# Patient Record
Sex: FEMALE | Race: WHITE | URBAN - METROPOLITAN AREA
[De-identification: names, ages, dates, MRNs, and addresses within clinical notes are randomized per-mention and may not be internally consistent; named-entity substitution may affect disease eponyms.]

---

## 2018-03-29 PROBLEM — F33.40 RECURRENT MAJOR DEPRESSIVE DISORDER, IN REMISSION: Status: ACTIVE | Noted: 2018-03-29

## 2018-03-29 PROBLEM — F33.0 MILD EPISODE OF RECURRENT MAJOR DEPRESSIVE DISORDER (HCC): Status: ACTIVE | Noted: 2018-03-29

## 2018-03-29 PROBLEM — F33.0 MILD EPISODE OF RECURRENT MAJOR DEPRESSIVE DISORDER: Status: ACTIVE | Noted: 2018-03-29

## 2018-03-29 PROBLEM — F33.40 RECURRENT MAJOR DEPRESSIVE DISORDER, IN REMISSION (HCC): Status: ACTIVE | Noted: 2018-03-29

## 2018-03-29 PROBLEM — F41.1 GAD (GENERALIZED ANXIETY DISORDER): Status: ACTIVE | Noted: 2018-03-29

## 2018-03-29 PROBLEM — F90.9 ATTENTION DEFICIT HYPERACTIVITY DISORDER (ADHD): Status: ACTIVE | Noted: 2018-03-29

## 2019-01-10 PROBLEM — F39 EPISODIC MOOD DISORDER: Status: ACTIVE | Noted: 2019-01-10

## 2019-01-10 PROBLEM — F39 EPISODIC MOOD DISORDER (HCC): Status: ACTIVE | Noted: 2019-01-10

## 2024-03-01 ENCOUNTER — OFFICE VISIT (OUTPATIENT)
Dept: UROLOGY | Facility: HOSPITAL | Age: 49
End: 2024-03-01
Attending: OBSTETRICS & GYNECOLOGY
Payer: COMMERCIAL

## 2024-03-01 VITALS — WEIGHT: 279 LBS | HEIGHT: 70 IN | BODY MASS INDEX: 39.94 KG/M2 | RESPIRATION RATE: 18 BRPM

## 2024-03-01 DIAGNOSIS — N39.41 URGE INCONTINENCE: ICD-10-CM

## 2024-03-01 DIAGNOSIS — R35.1 NOCTURIA: ICD-10-CM

## 2024-03-01 DIAGNOSIS — N81.84 PELVIC MUSCLE WASTING: ICD-10-CM

## 2024-03-01 DIAGNOSIS — N39.3 FEMALE STRESS INCONTINENCE: Primary | ICD-10-CM

## 2024-03-01 PROCEDURE — 87086 URINE CULTURE/COLONY COUNT: CPT | Performed by: OBSTETRICS & GYNECOLOGY

## 2024-03-01 RX ORDER — BUPROPION HYDROCHLORIDE 150 MG/1
150 TABLET ORAL DAILY
COMMUNITY

## 2024-03-01 RX ORDER — PROGESTERONE 100 MG/1
100 CAPSULE ORAL NIGHTLY
COMMUNITY

## 2024-03-01 RX ORDER — MELOXICAM 15 MG/1
15 TABLET ORAL DAILY
COMMUNITY

## 2024-03-01 RX ORDER — TOLTERODINE 4 MG/1
4 CAPSULE, EXTENDED RELEASE ORAL DAILY
COMMUNITY

## 2024-03-01 NOTE — PROGRESS NOTES
Norma Patrick, DO  3/1/2024     Referred by PFPT  Pt here with , Gene    Chief Complaint   Patient presents with    Incontinence     TRUMAN > UUI      HPI:  +TRUMAN  +UUI  Nocturia x1-2  Denies prolapse sx  +dyspareunia  Constipated bowels    PRIOR TREATMENTS:    Kegels  Physical Therapy  Recently started detrol, min relief  Took myrbetriq x2 mo  Took another med & stopped for constipation (vesicare)    Reports h/o UTIs  No gross hematuria    , vd x4  Cycles reg  Ablation in 2019    Biggest bother is TRUMAN  Some hesitation with sling surgery, wants to try pessary    Vitals:  Resp 18   Ht 70\"   Wt 279 lb (126.6 kg)   BMI 40.03 kg/m²      HISTORY:  Past Medical History:   Diagnosis Date    Arrhythmia     in past during go off effexor but determined per holter negative    Back pain     Cancer (HCC)     skin    Esophageal reflux     Essential hypertension     Gallbladder attack     not functioning, RUQ pain,     Gastroparesis     Headache disorder     pms headaches; better    Mass of stomach     Menorrhagia with regular cycle     Mild episode of recurrent major depressive disorder (HCC) 3/29/2018    Obesity     Post partum depression     four children; each time    Sleep apnea     cpap in past when gained wt      Past Surgical History:   Procedure Laterality Date    D & C      OTHER SURGICAL HISTORY      back surgery herniated disc lumbar    OTHER SURGICAL HISTORY      eyelid left    OTHER SURGICAL HISTORY      microdicectomy    OTHER SURGICAL HISTORY      skin cancer removed    OTHER SURGICAL HISTORY      laps and GI    OTHER SURGICAL HISTORY      TONSILLECTOMY        Family History   Problem Relation Age of Onset    Cancer Father     Heart Disorder Father     Anxiety Father     Other (A fib) Father     Other (leukemia) Father     Other (melanoma) Father     Depression Mother         oldest; was inpt at [facility] 2017; latuda, tourettes; eating disorder    Anxiety Mother     OCD Daughter     Depression Daughter      ADHD Son         Cash SEGURA ritalin    Cancer Maternal Grandfather     Hypertension Brother     Lipids Brother       Social History     Socioeconomic History    Marital status:    Tobacco Use    Smoking status: Never    Smokeless tobacco: Never   Substance and Sexual Activity    Alcohol use: Yes     Comment: socially couple times per year    Drug use: No        Allergies:  Allergies   Allergen Reactions    Sulfa Antibiotics RASH     As a child       Medications:  Outpatient Medications Prior to Visit   Medication Sig Dispense Refill    buPROPion  MG Oral Tablet 24 Hr Take 1 tablet (150 mg total) by mouth daily.      Meloxicam 15 MG Oral Tab Take 1 tablet (15 mg total) by mouth daily.      tolterodine ER 4 MG Oral Capsule SR 24 Hr Take 1 capsule (4 mg total) by mouth daily.      progesterone 100 MG Oral Cap Take 1 capsule (100 mg total) by mouth nightly. 1st 10 days of the month      ampicillin 500 MG Oral Cap       glycopyrrolate 2 MG Oral Tab       omeprazole 20 MG Oral Capsule Delayed Release Take 20 mg by mouth.      ampicillin Take 500 mg by mouth.      Triamterene Does not apply Powder Take 37.5 mg by mouth. Not sure dosage        No facility-administered medications prior to visit.       Urogynecology Summary:  Urogynecology Summary  Prolapse: No  TRUMAN: Yes (Reports more bothersome)  Urge Incontinence: Yes  Nocturia Frequency: 1 (Reports 1 to 2 times during the night.)  Frequency: 2 hours  Incomplete emptying: Yes (Reports sense of incomplete emptying at times. Reports shifting positions.)  Constipation: Yes (Bowel regimen reviewed.)  Wears pad day?: 6 (Wet during the day)  Wears Pad Night?: 1  Activities are limited by UI/POP?: No  Currently Sexually Active: Yes  Avoids sexual activity due to: Pain (Reports dysparunia. Reports using lubrication which is helpful)    Review of Systems:    A comprehensive 12 point review of systems was completed.  Pertinent positives noted in the the HPI.  No CP  No  SOB    GENERAL EXAM:  GENERAL:  Alert and Oriented, and NAD  HEENT:  Normal, no lesions  LUNGS:  Normal effort  HEART:  RRR  ABDOMEN: soft, no mass, no hernia  EXTREM:  Normal, no edema  SKIN:  Normal, no lesions    PELVIC EXAM:  Ext. Gen: no atrophy, no lesions  Urethra: no atrophy, nontender  Bladder:some fullness, nontender  Vagina: early atrophy  Cervix: + bleeding c/w menses, no lesions, nontender (tampon removed)  Uterus: +mobile  Adnexa:no masses, nontender  Perineum: nontender  Anus: wnl  Rectum: defer    PELVIS FLOOR NEUROMUSCULAR FUNCTION:  Strength:  1 and Unable to hold greater than 3 sec  Perineal Sensation:  Normal      PELVIC SUPPORT:  [place]:  1  Ant:  2  Post:  1  CST:  negative  UVJ: +hypermobile    Impression/Plan:    ICD-10-CM    1. Female stress incontinence  N39.3 Urine Culture, Routine      2. Urge incontinence  N39.41       3. Pelvic muscle wasting  N81.84       4. Nocturia  R35.1           Discussion Items:   Urodynamics and cystoscopy for evaluation of LUTS  Behavioral and pharmacologic treatments for OAB  Nonsurgical and surgical treatments for Stress Urinary Incontinence  Nonsurgical and surgical treatments for POP  Pelvic muscle rehabilitation including pelvic floor PT  Bowel routine/constipation regimen  Discussed dietary and behavioral modification, discussed pharmacologic and nonpharmacologic mgmt options for urinary symptoms. Discussed dietary & weight management with potential improvements in symptoms with weight loss.    Bowel management reviewed. Discussed using fiber daily w/ addition of miralax as needed    Diagnostic Items:  Urine culture    Medications Discussed:  Estrace Cream  OAB meds    Treatment Plan, Non-surgical:   RN teaching/pt education done  Fiber supplement  Detrol LA  Trial pessary    Treatment Plan, Surgical:   Mid-urethral slings (Trans Vaginal Taping, TOT, single-incision)    Pt verbalizes understanding of all above discussed information. All questions answered.  She agrees to plan    Return for pessary trial.    Norma Patrick DO, FACOG, FACS      Discussion undertaken in English, info provided

## 2024-03-04 ENCOUNTER — TELEPHONE (OUTPATIENT)
Dept: UROLOGY | Facility: HOSPITAL | Age: 49
End: 2024-03-04

## 2024-03-04 NOTE — TELEPHONE ENCOUNTER
Left detailed message that  the results from urine culture obtained on 3/1/24 showed no growth.  No treatment is necessary at this time.  Please call if you have na questions or concerns [phone]   Thank you   Frida SPEAR

## 2024-03-14 ENCOUNTER — OFFICE VISIT (OUTPATIENT)
Dept: UROLOGY | Facility: HOSPITAL | Age: 49
End: 2024-03-14
Attending: PHYSICIAN ASSISTANT
Payer: COMMERCIAL

## 2024-03-14 VITALS — BODY MASS INDEX: 39.94 KG/M2 | HEIGHT: 70 IN | RESPIRATION RATE: 18 BRPM | WEIGHT: 279 LBS

## 2024-03-14 DIAGNOSIS — N81.84 PELVIC MUSCLE WASTING: ICD-10-CM

## 2024-03-14 DIAGNOSIS — N39.3 FEMALE STRESS INCONTINENCE: Primary | ICD-10-CM

## 2024-03-14 DIAGNOSIS — N39.41 URGE INCONTINENCE: ICD-10-CM

## 2024-03-14 PROCEDURE — 99212 OFFICE O/P EST SF 10 MIN: CPT

## 2024-03-14 PROCEDURE — 57160 INSERT PESSARY/OTHER DEVICE: CPT

## 2024-03-14 NOTE — PROGRESS NOTES
Pt here for pessary trial due to bulge, TRUMAN    Wants to exhaust conservative options    She is not currently interested in surgical management of her pelvic organ prolapse / TRUMAN  Desires home care    Currently taking tolterodine ER 4 mg for UUI  Denies SEs  Reports some improvement    Previously tried:  Vesicare (constipation)  Myrbetriq    Urogynecology Summary:  Urogynecology Summary  Prolapse: No  TRUMAN: Yes (Reports more bothersome to patient.)  Urge Incontinence: Yes (Tolterodine 4 mg po daily( for the past two months). No side effects.)  Nocturia Frequency: 1 (1 to 2 times per night)  Frequency: 2 hours  Incomplete emptying: Yes (Reports a sense of incomplete emptying at times. reports shifting positions to void.)  Constipation: Yes (Reports following bowel regimen. Reports daily bowel movements.)  Wears pad day?:  (4)  Wears Pad Night?: 1  Activities are limited by UI/POP?: No  Currently Sexually Active: Yes  Avoids sexual activity due to: Pain (Reports dysparunia using lubrication which is helpful)    Vitals:  Vitals:    03/14/24 1228   Resp: 18       GENERAL EXAM:  GENERAL: alert & oriented, NAD  HEENT: NC/AT, sclera without injection  SKIN: no rashes  LUNGS:  without increased respiratory effort  ABDOMEN: soft, non-tender, non-distended, no masses appreciated  EXT: no edema    PELVIC EXAM:  Ext. Gen: no atrophy, no lesions  Urethra: no atrophy, nontender  Bladder:some fullness, nontender  Vagina: early atrophy  Cervix: no bleeding, no lesions, nontender  Uterus: +mobile  Adnexa: no masses, nontender  Perineum: nontender  Anus: wnl  Rectum: defer     PELVIS FLOOR NEUROMUSCULAR FUNCTION:  Strength:  1 and Unable to hold greater than 3 sec  Perineal Sensation:  Normal        PELVIC SUPPORT:  [place]:  1  Ant:  2  Post:  1  CST:  negative  UVJ: +hypermobile      A #3 incontinence dish was placed without difficulty.  Patient stated it was comfortable and supportive but leaked with jumping, removed  A #2 large knob  incontinence dish was placed without difficulty  Pt reported it to be comfortable and supportive, no leaking  Able to void freely (reports slower stream), 800 mL  Able to remove and insert independently.    Impression:    ICD-10-CM    1. Female stress incontinence  N39.3       2. Pelvic muscle wasting  N81.84       3. Urge incontinence  N39.41           Discussion Items:   Discussed management of pelvic organ prolapse including but not limited to behavioral modifications, conservative options, and surgical management.   Discussed pessary management including benefits and risks. Discussed importance of keeping regularly scheduled pessary checks in prevention of complications related to pessary use.     Treatment Plan:  Continue pessary management with plan for home care  Call with s/sx of UTI, problems with pessary   Continue tolterodine ER 4 mg daily  Bowel regimen  Pelvic floor home exercises  All questions answered  She understands and agrees to plan    Return in about 5 weeks (around 4/18/2024) for pessary check.      La Doe PA-C

## 2025-04-04 ENCOUNTER — TELEPHONE (OUTPATIENT)
Dept: INTERNAL MEDICINE CLINIC | Facility: CLINIC | Age: 50
End: 2025-04-04

## 2025-04-04 ENCOUNTER — OFFICE VISIT (OUTPATIENT)
Dept: INTERNAL MEDICINE CLINIC | Facility: CLINIC | Age: 50
End: 2025-04-04
Payer: COMMERCIAL

## 2025-04-04 VITALS
SYSTOLIC BLOOD PRESSURE: 134 MMHG | TEMPERATURE: 98 F | RESPIRATION RATE: 16 BRPM | HEART RATE: 86 BPM | DIASTOLIC BLOOD PRESSURE: 80 MMHG | BODY MASS INDEX: 36.41 KG/M2 | WEIGHT: 254.31 LBS | OXYGEN SATURATION: 99 % | HEIGHT: 70.12 IN

## 2025-04-04 DIAGNOSIS — R00.2 PALPITATIONS: Primary | ICD-10-CM

## 2025-04-04 DIAGNOSIS — G89.29 CHRONIC BILATERAL LOW BACK PAIN WITHOUT SCIATICA: ICD-10-CM

## 2025-04-04 DIAGNOSIS — E66.01 CLASS 2 SEVERE OBESITY DUE TO EXCESS CALORIES WITH SERIOUS COMORBIDITY AND BODY MASS INDEX (BMI) OF 36.0 TO 36.9 IN ADULT (HCC): ICD-10-CM

## 2025-04-04 DIAGNOSIS — M79.671 BILATERAL FOOT PAIN: ICD-10-CM

## 2025-04-04 DIAGNOSIS — R60.0 LOCALIZED EDEMA: ICD-10-CM

## 2025-04-04 DIAGNOSIS — F41.1 GAD (GENERALIZED ANXIETY DISORDER): ICD-10-CM

## 2025-04-04 DIAGNOSIS — K21.9 GASTROESOPHAGEAL REFLUX DISEASE, UNSPECIFIED WHETHER ESOPHAGITIS PRESENT: ICD-10-CM

## 2025-04-04 DIAGNOSIS — F33.0 MILD EPISODE OF RECURRENT MAJOR DEPRESSIVE DISORDER: ICD-10-CM

## 2025-04-04 DIAGNOSIS — E78.00 PURE HYPERCHOLESTEROLEMIA: ICD-10-CM

## 2025-04-04 DIAGNOSIS — M79.672 BILATERAL FOOT PAIN: ICD-10-CM

## 2025-04-04 DIAGNOSIS — F90.9 ATTENTION DEFICIT HYPERACTIVITY DISORDER (ADHD), UNSPECIFIED ADHD TYPE: ICD-10-CM

## 2025-04-04 DIAGNOSIS — K44.9 HIATAL HERNIA: ICD-10-CM

## 2025-04-04 DIAGNOSIS — E66.812 CLASS 2 SEVERE OBESITY DUE TO EXCESS CALORIES WITH SERIOUS COMORBIDITY AND BODY MASS INDEX (BMI) OF 36.0 TO 36.9 IN ADULT (HCC): ICD-10-CM

## 2025-04-04 DIAGNOSIS — R42 LIGHTHEADEDNESS: ICD-10-CM

## 2025-04-04 DIAGNOSIS — M54.50 CHRONIC BILATERAL LOW BACK PAIN WITHOUT SCIATICA: ICD-10-CM

## 2025-04-04 DIAGNOSIS — I10 PRIMARY HYPERTENSION: ICD-10-CM

## 2025-04-04 PROBLEM — F39 EPISODIC MOOD DISORDER: Status: RESOLVED | Noted: 2019-01-10 | Resolved: 2025-04-04

## 2025-04-04 PROCEDURE — 3075F SYST BP GE 130 - 139MM HG: CPT | Performed by: NURSE PRACTITIONER

## 2025-04-04 PROCEDURE — 3008F BODY MASS INDEX DOCD: CPT | Performed by: NURSE PRACTITIONER

## 2025-04-04 PROCEDURE — 99205 OFFICE O/P NEW HI 60 MIN: CPT | Performed by: NURSE PRACTITIONER

## 2025-04-04 PROCEDURE — 3079F DIAST BP 80-89 MM HG: CPT | Performed by: NURSE PRACTITIONER

## 2025-04-04 RX ORDER — DEXTROAMPHETAMINE SACCHARATE, AMPHETAMINE ASPARTATE MONOHYDRATE, DEXTROAMPHETAMINE SULFATE AND AMPHETAMINE SULFATE 7.5; 7.5; 7.5; 7.5 MG/1; MG/1; MG/1; MG/1
30 CAPSULE, EXTENDED RELEASE ORAL DAILY
COMMUNITY
Start: 2025-02-24 | End: 2025-04-04

## 2025-04-04 RX ORDER — LOSARTAN POTASSIUM 100 MG/1
TABLET ORAL
COMMUNITY
Start: 2025-04-01

## 2025-04-04 RX ORDER — ANTIOX #8/OM3/DHA/EPA/LUT/ZEAX 250-2.5 MG
CAPSULE ORAL
COMMUNITY

## 2025-04-04 RX ORDER — OXYBUTYNIN CHLORIDE 10 MG/1
10 TABLET, EXTENDED RELEASE ORAL DAILY
COMMUNITY
Start: 2025-02-07

## 2025-04-04 RX ORDER — MELATONIN
1000 DAILY
COMMUNITY

## 2025-04-04 RX ORDER — DEXTROAMPHETAMINE SACCHARATE, AMPHETAMINE ASPARTATE MONOHYDRATE, DEXTROAMPHETAMINE SULFATE AND AMPHETAMINE SULFATE 2.5; 2.5; 2.5; 2.5 MG/1; MG/1; MG/1; MG/1
10 CAPSULE, EXTENDED RELEASE ORAL DAILY
COMMUNITY
End: 2025-04-04

## 2025-04-04 RX ORDER — DEXTROAMPHETAMINE SACCHARATE, AMPHETAMINE ASPARTATE MONOHYDRATE, DEXTROAMPHETAMINE SULFATE AND AMPHETAMINE SULFATE 7.5; 7.5; 7.5; 7.5 MG/1; MG/1; MG/1; MG/1
30 CAPSULE, EXTENDED RELEASE ORAL DAILY
Qty: 30 CAPSULE | Refills: 0 | Status: SHIPPED | OUTPATIENT
Start: 2025-04-04

## 2025-04-04 RX ORDER — HYDROCHLOROTHIAZIDE 12.5 MG/1
12.5 TABLET ORAL DAILY
Qty: 30 TABLET | Refills: 0 | Status: SHIPPED | OUTPATIENT
Start: 2025-04-04

## 2025-04-04 RX ORDER — DEXTROAMPHETAMINE SACCHARATE, AMPHETAMINE ASPARTATE MONOHYDRATE, DEXTROAMPHETAMINE SULFATE AND AMPHETAMINE SULFATE 2.5; 2.5; 2.5; 2.5 MG/1; MG/1; MG/1; MG/1
10 CAPSULE, EXTENDED RELEASE ORAL DAILY
Qty: 30 CAPSULE | Refills: 0 | Status: SHIPPED | OUTPATIENT
Start: 2025-04-04

## 2025-04-04 RX ORDER — PROGESTERONE 200 MG/1
200 CAPSULE ORAL DAILY
COMMUNITY
Start: 2025-01-28

## 2025-04-04 RX ORDER — HYDROCHLOROTHIAZIDE 25 MG/1
25 TABLET ORAL EVERY MORNING
COMMUNITY
End: 2025-04-04 | Stop reason: DRUGHIGH

## 2025-04-04 RX ORDER — OMEPRAZOLE 40 MG/1
40 CAPSULE, DELAYED RELEASE ORAL 2 TIMES DAILY
COMMUNITY
Start: 2025-02-28

## 2025-04-04 NOTE — PATIENT INSTRUCTIONS
Decrease the hydrochlorothiazide to 12.5 mg daily and monitor your blood pressure closely as well as your lightheadedness.     Continue your other current medications    Labs will be ordered once your complete record is reviewed in the orders will be mailed to you.    Continue to see cardiology    Get the heart scan and carotid scan done    Make an appointment to see psychiatry once you get referrals    Go to the ER for any emergent symptoms. If you cannot get there safely call 911.     Follow-up as needed or in 3 months for a blood pressure check.  If your annual physical is due sooner you may return for that.

## 2025-04-04 NOTE — PROGRESS NOTES
[facility]    CHIEF COMPLAINT:    Chief Complaint   Patient presents with    [facility]     New Patient     ER F/U     02/17/2025 , heart stuff going on          HISTORY OF PRESENT ILLNESS:  The patient is a 49 year old year old female who presents to [facility].     HTN- was on amlodipine. Now on losartan and hydrochlorothiazide. SE of dizziness- feels lightheaded since being on hydrochlorothiazide. Is mild to moderate. No near syncope or syncope. Is hydrating well.     HLD- low CVD risk. Not on statin. Family hx of stroke at young age for father while in his 40s.    Obesity- was 410 pounds and is working on it- lost 150 with lifestyle changes. Down 60 pounds since October- slightly unexplainable due to not trying. Less stress though.     Has localized edema to BLE for years at the end of the day. Compression socks helps.     Has a hx of lumbar radiculopathy and is s/p 2 back surgeries. Back is stable.     Hx of plantar fascitis and foot pain. Foot pain is better- stable.     Depression, anxiety, ADD-  current medications  are working well.  Possible SE to ADD medication.     In February she was treated in  for right side chest pain thought to be pleurisy. Placed on steroids. About a week later she felt like her heart was racing and an irregular rhythm. Then noticed after every workout she was having HA. Would drink water and would get better but felt off through the day. Arms and legs felt heavy. Numbness arms and legs > arms. Gradual BLE swelling - did not have improvement and after work it made it worse. Had swelling to arms. Warm and hot to knees and ankles. Pitting edema. Went to ER at [place]- no record available. Then went to PCP on thurs and was started on hydrochlorothiazide. She also recently saw cardiology and an echo and holter were done. Echo completed. Holter on now. She denies chest pain, sob. She did have erythema and warmth to her knees and ankles when they were the most swollen.  That has improved.    Medical, surgical, family, and social hx reviewed in detail.     Past Medical History:   Past Medical History:    Anxiety    Arrhythmia    in past during go off effexor but determined per holter negative    Back pain    Cancer (HCC)    skin    Depression    Esophageal reflux    Essential hypertension    Gallbladder attack    not functioning, RUQ pain,     Gastroparesis    Headache disorder    pms headaches; better    Mass of stomach    Menorrhagia with regular cycle    Mild episode of recurrent major depressive disorder    Obesity    Post partum depression    four children; each time    Sleep apnea    cpap in past when gained wt        Past Surgical History:   Past Surgical History:   Procedure Laterality Date    Colonoscopy      D & c      Other surgical history      back surgery herniated disc lumbar    Other surgical history      eyelid left    Other surgical history      microdicectomy    Other surgical history      skin cancer removed    Other surgical history      laps and GI    Other surgical history      Tonsillectomy         Current Medications:    Current Outpatient Medications   Medication Sig Dispense Refill    losartan 100 MG Oral Tab losartan 100 mg tablet, [RxNorm: 626148]      amphetamine-dextroamphetamine ER 30 MG Oral Capsule SR 24 Hr Take 1 capsule (30 mg total) by mouth daily.      buPROPion  MG Oral Tablet 24 Hr Take 1 tablet (150 mg total) by mouth daily.      omeprazole 20 MG Oral Capsule Delayed Release Take 1 capsule (20 mg total) by mouth.      amphetamine-dextroamphetamine ER 10 MG Oral Capsule SR 24 Hr Take 1 capsule (10 mg total) by mouth daily.      Meloxicam 15 MG Oral Tab Take 1 tablet (15 mg total) by mouth daily.      tolterodine ER 4 MG Oral Capsule SR 24 Hr Take 1 capsule (4 mg total) by mouth daily.      progesterone 100 MG Oral Cap Take 1 capsule (100 mg total) by mouth nightly. 1st 10 days of the month      ampicillin 500 MG Oral Cap        glycopyrrolate 2 MG Oral Tab       ampicillin Take 500 mg by mouth.      Triamterene Does not apply Powder Take 37.5 mg by mouth. Not sure dosage             Allergies:    Allergies as of 04/04/2025 - Review Complete 04/04/2025   Allergen Reaction Noted    Sulfa antibiotics RASH 06/28/2017       SOCIAL HISTORY:    Social History     Socioeconomic History    Marital status:      Spouse name: Not on file    Number of children: Not on file    Years of education: Not on file    Highest education level: Not on file   Occupational History    Not on file   Tobacco Use    Smoking status: Never    Smokeless tobacco: Never   Substance and Sexual Activity    Alcohol use: Yes     Comment: socially couple times per year    Drug use: No    Sexual activity: Not on file   Other Topics Concern    Not on file   Social History Narrative    Not on file     Social Drivers of Health     Food Insecurity: Not on file   Transportation Needs: Not on file   Stress: Not on file   Housing Stability: Not on file       FAMILY HISTORY:   Family History   Problem Relation Age of Onset    Cancer Father     Heart Disorder Father     Other (A fib) Father     Other (leukemia) Father     Other (melanoma) Father     Depression Mother         oldest; was inpt at [facility] 2017; latuda, boris; eating disorder    Anxiety Mother     Pacemaker Mother     OCD Daughter     Depression Daughter     Bipolar Disorder Daughter     High Blood Pressure Daughter     High Blood Pressure Son     High Cholesterol Son     Dementia Maternal Grandmother     Colon Cancer Maternal Grandfather     No Known Problems Paternal Grandmother     No Known Problems Paternal Grandfather     Hypertension Brother        REVIEW OF SYSTEMS:  Review of Systems   Constitutional: Negative.    HENT: Negative.     Eyes: Negative.    Respiratory: Negative.     Cardiovascular:  Positive for palpitations and leg swelling.   Gastrointestinal: Negative.    Endocrine: Negative.    Genitourinary:  Negative.    Musculoskeletal: Negative.    Skin: Negative.    Allergic/Immunologic: Negative.    Neurological: Negative.    Hematological: Negative.    Psychiatric/Behavioral: Negative.       EXERCISE ASSESSMENT AND COUNSELING  Yes     PAIN ASSESSMENT (Patient reports Pain):No       FALL ASSESSMENT:    Falls in the last 12 months: No    FUNCTIONAL ASSESSMENT (Able to perform all ADLs):  Yes    BODY HABITUS AND NUTRITION STATUS:  Body mass index is 36.37 kg/m².    DEPRESSION ASSESSMENT:  No    PHYSICAL EXAM:   /80 (BP Location: Left arm, Patient Position: Sitting, Cuff Size: large)   Pulse 86   Temp 97.5 °F (36.4 °C) (Temporal)   Resp 16   Ht 5' 10.12\" (1.781 m)   Wt 254 lb 4.8 oz (115.3 kg)   SpO2 99%   BMI 36.37 kg/m²   Body mass index is 36.37 kg/m².   GENERAL: well developed, well nourished,in no apparent distress  SKIN: no rashes,no suspicious lesions  HEENT: atraumatic, normocephalic  LUNGS: clear to auscultation; no rhonchi, rales, or wheezing  CARDIO: RRR without murmur  GI: no masses, organomegaly or tenderness  MUSCULOSKELETAL: No obvious joint deformity or swelling.  Normal gait.  EXTREMITIES: no calve tenderness, has trace edema that is nonpitting to BLE R>L   NEURO: Oriented times three,cranial nerves are grossly intact,motor and sensory are grossly intact    Labs:   No results found for: \"WBC\", \"HGB\", \"PLT\"   No results found for: \"GLU\", \"NA\", \"K\", \"CL\", \"CO2\", \"CREATSERUM\", \"CA\", \"ALB\", \"TP\", \"ALKPHO\", \"AST\", \"ALT\", \"BILT\", \"TSH\", \"T4F\"     No results found for: \"CHOLEST\", \"HDL\", \"TRIG\", \"LDL\", \"NONHDLC\"    No results found for: \"A1C\"   Vitamin D:   No results found for: \"VITD\"      IMAGING:     No results found.     ASSESSMENT AND PLAN:   1. Palpitations  2. Lightheadedness  - continue to see cardiology  - return to ER as needed  - change BP meds due to hydrochlorothiazide increasing lightheadedness and monitor for improvement  - labs to be ordered once record from hospital reviewed      3. Primary hypertension  - lower hydrochlorothiazide  - continue losartan   - monitor BP  - hydroCHLOROthiazide 12.5 MG Oral Tab; Take 1 tablet (12.5 mg total) by mouth daily.  Dispense: 30 tablet; Refill: 0    4. Localized edema  - stable. Continue compression socks, elevate legs, decrease hydration as is drinking a gallon of water a day    5. Pure hypercholesterolemia  - low fat diet and exercise     6. Class 2 severe obesity due to excess calories with serious comorbidity and body mass index (BMI) of 36.0 to 36.9 in adult (HCC)  - lifestyle changes     7. Gastroesophageal reflux disease, unspecified whether esophagitis present  - stable. Continue current management     8. Hiatal hernia  - stable. Continue current management     9. Mild episode of recurrent major depressive disorder  10. MILLIE (generalized anxiety disorder)  11. Attention deficit hyperactivity disorder (ADHD), unspecified ADHD type  - doing well on current meds but possible SE  - continue management of palpitations per cardiology. May need to do a trial off them to see if it is a SE or not.   - see psych for management   - amphetamine-dextroamphetamine ER 30 MG Oral Capsule SR 24 Hr; Take 1 capsule (30 mg total) by mouth daily. Take with 10 mg tablet to make 40 mg.  Dispense: 30 capsule; Refill: 0  - amphetamine-dextroamphetamine ER 10 MG Oral Capsule SR 24 Hr; Take 1 capsule (10 mg total) by mouth daily. Take with 30 mg daily to make 40 mg  Dispense: 30 capsule; Refill: 0  -  NAVIGATOR    12. Chronic bilateral low back pain without sciatica  - stable. CTM    13. Bilateral foot pain  - stable. CTM       MADDISON Taylor    Follow up as needed or when PE is due    Total face to face time was 60 mins, more than 50% of the time was spent in counseling and/or coordination of care related to the above diagnosis.

## 2025-04-04 NOTE — TELEPHONE ENCOUNTER
Faxed over medical records over to [facility] for ER notes     Faxed # [phone]

## 2025-04-07 ENCOUNTER — TELEPHONE (OUTPATIENT)
Dept: INTERNAL MEDICINE CLINIC | Facility: CLINIC | Age: 50
End: 2025-04-07

## 2025-04-07 NOTE — TELEPHONE ENCOUNTER
Incoming (mail or fax):  fax   Received from:  Dr Forte office   Documentation given to:  Justine

## 2025-04-10 ENCOUNTER — TELEPHONE (OUTPATIENT)
Age: 50
End: 2025-04-10

## 2025-04-11 ENCOUNTER — ORDER TRANSCRIPTION (OUTPATIENT)
Dept: ADMINISTRATIVE | Facility: HOSPITAL | Age: 50
End: 2025-04-11

## 2025-04-11 DIAGNOSIS — Z13.6 SCREENING FOR CARDIOVASCULAR CONDITION: Primary | ICD-10-CM

## 2025-04-15 ENCOUNTER — TELEPHONE (OUTPATIENT)
Age: 50
End: 2025-04-15

## 2025-04-15 ENCOUNTER — TELEPHONE (OUTPATIENT)
Dept: INTERNAL MEDICINE CLINIC | Facility: CLINIC | Age: 50
End: 2025-04-15

## 2025-04-15 NOTE — TELEPHONE ENCOUNTER
Fadumo did not order heart scan, the  put in the heart scan order when scheduling...    Spoke to pt, she called the [phone] from pamphlet & not central scheduling- they refused to schedule her for PVD screening stating this needed order. This does NOT need order. Yenni- this is the 2nd time our patients have reported that schedulers are telling pt the heart scan and PVD screenings need an order- these are self pay orders. Can you look into this, are they training differently?    I advised her to schedule online and she verbalized understanding.

## 2025-04-15 NOTE — TELEPHONE ENCOUNTER
Patient thought Fadumo wanted her to have a carotid artery ultrasound.  The  said there wasn't an order in the system for that.  Please fax an order to [phone].    She was able to schedule the CT calcium score test because that order was in Epic.    Please advise.  Thank you!

## 2025-04-18 NOTE — TELEPHONE ENCOUNTER
This was addressed and central scheduling should be calling the pt to schedule screening carotid. Thanks.

## 2025-05-02 ENCOUNTER — TELEPHONE (OUTPATIENT)
Age: 50
End: 2025-05-02

## 2025-05-02 DIAGNOSIS — F90.9 ATTENTION DEFICIT HYPERACTIVITY DISORDER (ADHD), UNSPECIFIED ADHD TYPE: ICD-10-CM

## 2025-05-04 ENCOUNTER — HOSPITAL ENCOUNTER (OUTPATIENT)
Dept: CT IMAGING | Age: 50
Discharge: HOME | End: 2025-05-04
Attending: NURSE PRACTITIONER

## 2025-05-04 DIAGNOSIS — Z13.6 SCREENING FOR CARDIOVASCULAR CONDITION: ICD-10-CM

## 2025-05-06 RX ORDER — DEXTROAMPHETAMINE SACCHARATE, AMPHETAMINE ASPARTATE MONOHYDRATE, DEXTROAMPHETAMINE SULFATE AND AMPHETAMINE SULFATE 2.5; 2.5; 2.5; 2.5 MG/1; MG/1; MG/1; MG/1
10 CAPSULE, EXTENDED RELEASE ORAL DAILY
Qty: 30 CAPSULE | Refills: 0 | Status: SHIPPED | OUTPATIENT
Start: 2025-05-06

## 2025-05-06 RX ORDER — DEXTROAMPHETAMINE SACCHARATE, AMPHETAMINE ASPARTATE MONOHYDRATE, DEXTROAMPHETAMINE SULFATE AND AMPHETAMINE SULFATE 7.5; 7.5; 7.5; 7.5 MG/1; MG/1; MG/1; MG/1
30 CAPSULE, EXTENDED RELEASE ORAL DAILY
Qty: 30 CAPSULE | Refills: 0 | Status: SHIPPED | OUTPATIENT
Start: 2025-05-06

## 2025-05-06 NOTE — TELEPHONE ENCOUNTER
Last OV relevant to medication: 4/4/25 - [facility], ER F/U  Last refill date: 4/4/25     #/refills: 30/0  When pt was asked to return for OV: Rtn when PE Due  Upcoming appt/reason: 7/3/25 - BP Check  Was pt informed of any over due labs: None    FYI -   Patient Comment: Fadumo - I have a virtual visit scheduled on 5/23 for medication maintenance with NP Podlasek, but i will run out by mid-week next week.

## 2025-05-13 DIAGNOSIS — I10 PRIMARY HYPERTENSION: ICD-10-CM

## 2025-05-14 RX ORDER — HYDROCHLOROTHIAZIDE 12.5 MG/1
12.5 TABLET ORAL DAILY
Qty: 90 TABLET | Refills: 0 | Status: SHIPPED | OUTPATIENT
Start: 2025-05-14

## 2025-05-14 NOTE — TELEPHONE ENCOUNTER
Last OV relevant to medication: 4/4/25  Last refill date: 4/4/25 30     #/refills: 0  When pt was asked to return for OV: 6 months   Upcoming appt/reason:   Future Appointments   Date Time Provider Department Center   5/23/2025  8:30 AM Anastacia Castillo APRN [facility] [place]   6/12/2025  2:00 PM [facility] [place]   7/3/2025  1:20 PM Gretchen Ricci APRN [facility] [place]       Was pt informed of any over due labs: n/a   No results found for: \"GLU\", \"BUN\", \"BUNCREA\", \"CREATSERUM\", \"ANIONGAP\", \"GFR\", \"GFRNAA\", \"GFRAA\", \"CA\", \"OSMOCALC\", \"ALKPHO\", \"AST\", \"ALT\", \"ALKPHOS\", \"BILT\", \"TP\", \"ALB\", \"GLOBULIN\", \"AGRATIO\", \"NA\", \"K\", \"CL\", \"CO2\"    3. Primary hypertension  - lower hydrochlorothiazide  - continue losartan   - monitor BP  - hydroCHLOROthiazide 12.5 MG Oral Tab; Take 1 tablet (12.5 mg total) by mouth daily.  Dispense: 30 tablet; Refill: 0

## 2025-06-12 ENCOUNTER — HOSPITAL ENCOUNTER (OUTPATIENT)
Dept: ULTRASOUND IMAGING | Age: 50
Discharge: HOME | End: 2025-06-12
Attending: INTERNAL MEDICINE

## 2025-06-12 ENCOUNTER — TELEPHONE (OUTPATIENT)
Dept: INTERNAL MEDICINE CLINIC | Facility: CLINIC | Age: 50
End: 2025-06-12

## 2025-06-12 DIAGNOSIS — Z13.6 SCREENING FOR HEART DISEASE: ICD-10-CM

## 2025-06-24 ENCOUNTER — MED REC SCAN ONLY (OUTPATIENT)
Dept: INTERNAL MEDICINE CLINIC | Facility: CLINIC | Age: 50
End: 2025-06-24

## 2025-06-26 LAB
AMB EXT CHOLESTEROL, TOTAL: 185 MG/DL
AMB EXT HDL CHOLESTEROL: 49 MG/DL
AMB EXT LDL CHOLESTEROL, DIRECT: 113 MG/DL
AMB EXT TRIGLYCERIDES: 115 MG/DL
AMB EXT TSH: 1.5 MIU/ML
AMB EXT VLDL: 23 MG/DL

## 2025-06-27 ENCOUNTER — PATIENT MESSAGE (OUTPATIENT)
Dept: INTERNAL MEDICINE CLINIC | Facility: CLINIC | Age: 50
End: 2025-06-27

## 2025-06-30 ENCOUNTER — TELEPHONE (OUTPATIENT)
Dept: INTERNAL MEDICINE CLINIC | Facility: CLINIC | Age: 50
End: 2025-06-30

## 2025-06-30 NOTE — TELEPHONE ENCOUNTER
Entered what I could in external labs  ESR-4  Uric- 4.2  Mag- 2.03  O2nnpc-3.29  Crp-<5  RA- <4  Ccp- 0.7  Doreen- 0.2  Ccp antibodies- <15.6  Lyme igm- 0.03  Lyme igg- 0.02    In Fadumo's bin thanks!

## 2025-06-30 NOTE — TELEPHONE ENCOUNTER
Incoming (mail or fax):  fax   Received from:  [facility]  Documentation given to:  results bin

## 2025-07-03 ENCOUNTER — TELEPHONE (OUTPATIENT)
Dept: INTERNAL MEDICINE CLINIC | Facility: CLINIC | Age: 50
End: 2025-07-03

## 2025-07-03 ENCOUNTER — OFFICE VISIT (OUTPATIENT)
Dept: INTERNAL MEDICINE CLINIC | Facility: CLINIC | Age: 50
End: 2025-07-03
Payer: COMMERCIAL

## 2025-07-03 VITALS
DIASTOLIC BLOOD PRESSURE: 92 MMHG | BODY MASS INDEX: 35.7 KG/M2 | OXYGEN SATURATION: 98 % | TEMPERATURE: 97 F | HEART RATE: 65 BPM | RESPIRATION RATE: 16 BRPM | SYSTOLIC BLOOD PRESSURE: 138 MMHG | WEIGHT: 249.38 LBS | HEIGHT: 70.12 IN

## 2025-07-03 DIAGNOSIS — E78.00 PURE HYPERCHOLESTEROLEMIA: ICD-10-CM

## 2025-07-03 DIAGNOSIS — I10 PRIMARY HYPERTENSION: ICD-10-CM

## 2025-07-03 DIAGNOSIS — Z00.00 ENCOUNTER FOR ANNUAL PHYSICAL EXAM: Primary | ICD-10-CM

## 2025-07-03 DIAGNOSIS — R00.2 PALPITATIONS: ICD-10-CM

## 2025-07-03 DIAGNOSIS — R60.0 LOCALIZED EDEMA: ICD-10-CM

## 2025-07-03 DIAGNOSIS — I87.2 VENOUS INSUFFICIENCY: ICD-10-CM

## 2025-07-03 DIAGNOSIS — R00.0 TACHYCARDIA: ICD-10-CM

## 2025-07-03 DIAGNOSIS — R53.83 FATIGUE, UNSPECIFIED TYPE: ICD-10-CM

## 2025-07-03 RX ORDER — HYDROCHLOROTHIAZIDE 25 MG/1
25 TABLET ORAL DAILY
Qty: 90 TABLET | Refills: 0 | Status: SHIPPED | OUTPATIENT
Start: 2025-07-03

## 2025-07-03 RX ORDER — LOSARTAN POTASSIUM 100 MG/1
100 TABLET ORAL DAILY
Qty: 90 TABLET | Refills: 1 | Status: SHIPPED | OUTPATIENT
Start: 2025-07-03

## 2025-07-03 NOTE — PATIENT INSTRUCTIONS
PCV 21 vaccine recommended- get a price check at pharmacy     Check with your insurance to verify coverage for the Shingrix vaccine for shingles. Also check to see where you can go to get the vaccine. You can also get a price check at the pharmacy instead.      Flu shot recommended.  Mid October    Increase hydrochlorothiazide to 25 mg daily and monitor closely for side effects, effectiveness.     Send BP readings to [facility] in 2 weeks or by calling the office.    Get your lab done in a month to check your kidney numbers and electrolytes with the increase in hydrochlorothiazide. No need to fast.     Continue your other current medication    Make an apt to see rheumatology    Continue to see cardiology as needed    Go to the ER for any emergent symptoms. If you cannot get there safely call 911.      Follow up in 6 months for your BP check or sooner as needed    VISIT SUMMARY:  During your visit, we discussed your palpitations, knee pain, leg swelling, and other ongoing health issues. We have made some adjustments to your medications and recommended further evaluations to better manage your symptoms.    YOUR PLAN:  PALPITATIONS AND TACHYCARDIA: You have been experiencing episodes of palpitations and a high heart rate, especially during exercise or at night.  -Take metoprolol as needed for symptom relief during episodes.  -Monitor your heart rate and symptoms, especially during episodes.  -If symptoms persist, we may refer you to a rheumatologist for further evaluation.    KNEE PAIN: You have been experiencing severe knee pain that disrupts your sleep.  -We may refer you to a rheumatologist for further evaluation of your knee pain and possible chronic gout.    VENOUS INSUFFICIENCY: You have swelling in your legs, especially after prolonged sitting or standing, likely due to venous insufficiency.  -We will order a venous Doppler ultrasound to assess for venous insufficiency. Please ensure the test is performed while  standing.  -Increase your hydrochlorothiazide to 25 mg during the summer months to manage swelling,    -Monitor for side effects such as dizziness or muscle cramps.    HIATAL HERNIA WITH GASTROESOPHAGEAL REFLUX: You have a hiatal hernia contributing to acid reflux.  -Continue taking omeprazole and follow dietary modifications.    RECURRENT PNEUMONIA: You have a history of recurrent pneumonia and a small pulmonary nodule.  -Monitor for any respiratory symptoms and follow up as needed.    PLANTAR FASCIITIS: You have experienced a recent flare-up of plantar fasciitis after physical activity.  -Use heat and ice therapy to manage symptoms after activity.  -Monitor your symptoms and adjust your activity levels as needed.     MIGRAINE: You have occasional migraines, possibly linked to hormonal changes.  -Monitor the frequency and triggers of your migraines.  -Consider hormonal influences in managing your migraines.    Contains text generated by Harika

## 2025-07-03 NOTE — PROGRESS NOTES
The following individual(s) verbally consented to be recorded using ambient AI listening technology and understand that they can each withdraw their consent to this listening technology at any point by asking the clinician to turn off or pause the recording:    Patient name: Amy Roberts      CHIEF COMPLAINT   Complete physical, other issues    HPI:   Amy Roberts is a 49 year old female who presents for a complete physical exam, other issues.     Diet and exercise are good. Vaccines reviewed. Wearing seat belt and no texting and driving. Feels safe at home. Pap UTD. Sees gyne. Mammo UTD. Colon cancer screening UTD. Record requested. No smoking. Occasional alcohol. No drinking and driving. No skin concerns. Sees derm. Sees a dentist routinely. Labs to be ordered/reviewed.      HTN-  losartan and hydrochlorothiazide. Had dizziness with 25 mg hydrochlorothiazide before- was decreased to 12.5 mg and dizziness is better but now left leg swelling is worse. Would like to go up to 25 mg again and watch it. Is hydrating well.     HLD- low CVD risk. Not on statin.  Had a heart scan and carotid scan recently that were clear.    Obesity- was 410 pounds and is working on it- lost 150 with lifestyle changes. Doing well.     Has localized edema to BLE for years at the end of the day. Compression socks helps. Left leg > right. Had doppler that was negative for DVT. Has tenderness to the left shin at times when swelling is worse.    Palpitations-   She experiences episodes of palpitations, with five occurrences since her last visit. These episodes are milder compared to a previous one that lasted four weeks. During these episodes, her heart rate can reach up to 150 bpm, especially during exercise, and it takes several hours for her heart rate to normalize. She describes a 'heavy feeling' during these episodes but does not experience increased shortness of breath. She has undergone a heart scan,  echocardiogram, carotid ultrasound, and Holter monitoring, and has consulted with a cardiologist. Everything came out okay.           Wt Readings from Last 6 Encounters:   07/03/25 249 lb 6.4 oz (113.1 kg)   04/04/25 254 lb 4.8 oz (115.3 kg)   03/14/24 279 lb (126.6 kg)   03/01/24 279 lb (126.6 kg)     Body mass index is 35.66 kg/m².     Cholesterol, Total (mg/dL)   Date Value   06/26/2025 185     HDL Cholesterol (mg/dL)   Date Value   06/26/2025 49     AST (U/L)   Date Value   02/25/2025 13     ALT (U/L)   Date Value   02/25/2025 9        Current Medications[1]   Allergies[2]   Past Medical History[3]   Past Surgical History[4]   Family History[5]   Social History:   Short Social Hx on File[6]     REVIEW OF SYSTEMS:   GENERAL: feels well otherwise  SKIN: no complaint of any unusual skin lesions  EYES: no complaint of blurred vision or double vision  HEENT: no complaint of nasal congestion, sinus pain or ST  LUNGS: no complaint of shortness of breath with exertion  CARDIOVASCULAR: no complaint of chest pain on exertion  GI: no complaint of pain,denies heartburn  : no complaints of vaginal discharge or urinary complaints  MUSCULOSKELETAL: no complaint of back pain  NEURO: no complaint of headaches  PSYCHE: no complaint of depression or anxiety  HEMATOLOGIC: denies hx of anemia    EXAM:   BP (!) 138/92 (BP Location: Left arm, Patient Position: Sitting, Cuff Size: large)   Pulse 55   Temp 97 °F (36.1 °C) (Temporal)   Resp 16   Ht 5' 10.12\" (1.781 m)   Wt 249 lb 6.4 oz (113.1 kg)   SpO2 98%   BMI 35.66 kg/m²   Body mass index is 35.66 kg/m².   GENERAL: well developed, well nourished, in no apparent distress  SKIN: no rashes, no suspicious lesions  HEENT: atraumatic, normocephalic, ears are clear  EYES:PERRLA, EOMI, conjunctiva are clear  NECK: supple,no adenopathy, no thyroid masses.  BREAST: DEFERRED TO GYNE  LUNGS: clear to auscultation; no rhonchi, rales, or wheezing  CARDIO: RRR without murmur  GI: no  tenderness or masses  : DEFERRED TO GYNE   MUSCULOSKELETAL: No obvious joint deformity or swelling.  Normal gait.  EXTREMITIES: no significant edema or calve tenderness  NEURO: Oriented times three,cranial nerves are grossly intact,motor and sensory are grossly intact    LABS:     Lab Results   Component Value Date    WBC 9.1 02/25/2025    HGB 13 02/25/2025    HCT 39.2 02/25/2025    MCV 86.5 02/25/2025     02/25/2025      Lab Results   Component Value Date    GLU 93 02/25/2025    BUN 14 02/25/2025    CREATSERUM 0.90 02/25/2025    GFRNAA 70 02/25/2025    CA 9.5 02/25/2025    AST 13 02/25/2025    ALT 9 02/25/2025    BILT 0.5 02/25/2025    TP 6.7 02/25/2025    K 4.0 02/25/2025     02/25/2025    CO2 28 02/25/2025      Lab Results   Component Value Date    CHOLEST 185 06/26/2025    TRIG 115 06/26/2025    HDL 49 06/26/2025    VLDL 23 06/26/2025      Lab Results   Component Value Date    TSH 1.500 06/26/2025      No results found for: \"EAG\", \"A1C\"    IMAGING:     CARD US PV SCREENING SELF PAY  Result Date: 6/15/2025  PROCEDURE: CARD US PV CAROTID SCREENING  COMPARISON: None.  INDICATIONS: Screening for heart disease  NOTE:   This patient identified you as their physician.  If this is not correct, please contact the [facility] and  at [phone], and he will assign the report to another physician.  For your convenience, the patient's name, address and phone number is as follows:    JAUN TARAS, [address] [place] [id number], [phone]   TECHNIQUE:   An ultrasound screening study was performed to evaluate the internal carotid arteries.         CONCLUSION:  1. INTERNAL CAROTID ARTERIES:    Right ICA = 1  Left ICA = 1   1= Normal (No plaque, ICA PSV <150 cm/s)  2= Mild   (Plaque present, ICA PSV <150 cm/s)  3= Moderate   (ICA PSV between 150-225 cm/s)  4= Severe   (ICA PSV >225)   INCIDENTAL FINDINGS:   None.      Dictated by (CST): Nito Thomson MD on 6/15/2025 at 9:00 AM      Finalized by (CST): Nito Thomson MD on 6/15/2025 at 9:01 AM             ASSESSMENT AND PLAN:   1. Encounter for annual physical exam  - Amy Roberts is a 49 year old female who presents for a complete physical exam.  Pap UTD and pelvic deferred to gyne. Encouraged self breast exams. Mammogram UTD. Reviewed diet and exercise. Pt' s weight is Body mass index is 35.66 kg/m².. Labs reviewed/ordered. Vaccines reviewed. C scope UTD. Sees derm and dentist.     2. Primary hypertension  - elevated slightly.   - increase hydrochlorothiazide and monitor  - repeat lab  - send readings in a couple weeks  - Basic Metabolic Panel (8) [E]  - losartan 100 MG Oral Tab; Take 1 tablet (100 mg total) by mouth daily.  Dispense: 90 tablet; Refill: 1    3. Pure hypercholesterolemia  - low fat diet and exercise    4. Palpitations  5. Tachycardia  - improved but still occurring at times.   - CTM closely  - metoprolol as needed was discussed to be used as needed. SE discussed. Pt will let the office know if she wants to try it.     6. Venous insufficiency  7. Localized edema  - likely venous insuff  - continue teds  - elevate legs   - increase HCTZ  - get US to check for reflux   - US VENOUS INSUFFICIENCY (REFLUX) BILAT LOWER EXT (CPT=93970); Future    8. Fatigue, unspecified type  - see rheum for eval as a prec   - Rheumatology Referral - In Network       Follow up in 6 months for a med check or sooner as needed   The patient indicates understanding of these issues and agrees to the plan.         [1]   Current Outpatient Medications   Medication Sig Dispense Refill    traZODone 50 MG Oral Tab Take 0.5 - 1 tablet (25 mg - 50 mg total) by mouth nightly as needed for Sleep. 30 tablet 1    buPROPion ER (WELLBUTRIN XL) 150 MG Oral Tablet 24 Hr Take 1 tablet (150 mg total) by mouth every morning. 90 tablet 0    [START ON 7/5/2025] amphetamine-dextroamphetamine ER (ADDERALL XR) 30 MG Oral Capsule SR 24 Hr Take 1 capsule (30 mg  total) by mouth daily. Take with 10 mg tablet to make 40 mg. 30 capsule 0    [START ON 8/4/2025] amphetamine-dextroamphetamine ER (ADDERALL XR) 30 MG Oral Capsule SR 24 Hr Take 1 capsule (30 mg total) by mouth daily. Take with 10 mg tablet to make 40 mg. 30 capsule 0    [START ON 7/5/2025] amphetamine-dextroamphetamine ER (ADDERALL XR) 10 MG Oral Capsule SR 24 Hr Take 1 capsule (10 mg total) by mouth daily. Take with 30 mg daily to make 40 mg 30 capsule 0    [START ON 8/4/2025] amphetamine-dextroamphetamine ER (ADDERALL XR) 10 MG Oral Capsule SR 24 Hr Take 1 capsule (10 mg total) by mouth daily. Take with 30 mg daily to make 40 mg 30 capsule 0    amphetamine-dextroamphetamine ER 10 MG Oral Capsule SR 24 Hr Take 1 capsule (10 mg total) by mouth daily. Take with 30 mg daily to make 40 mg 30 capsule 0    amphetamine-dextroamphetamine ER 30 MG Oral Capsule SR 24 Hr Take 1 capsule (30 mg total) by mouth daily. Take with 10 mg tablet to make 40 mg. 30 capsule 0    clonazePAM (KLONOPIN) 0.5 MG Oral Tab Take 1 tablet (0.5 mg total) by mouth 2 (two) times daily as needed for Anxiety. 28 tablet 0    hydroCHLOROthiazide 12.5 MG Oral Tab TAKE 1 TABLET BY MOUTH EVERY DAY 90 tablet 0    losartan 100 MG Oral Tab losartan 100 mg tablet, [RxNorm: 202157]      progesterone 200 MG Oral Cap Take 1 capsule (200 mg total) by mouth daily. 1st 10 days of the month      oxybutynin ER 10 MG Oral Tablet 24 Hr Take 1 tablet (10 mg total) by mouth daily.      Omeprazole 40 MG Oral Capsule Delayed Release Take 1 capsule (40 mg total) by mouth 2 (two) times daily.      cyanocobalamin 1000 MCG Oral Tab Take 1 tablet (1,000 mcg total) by mouth daily.      Calcium 600 MG Oral Tab Take 600 mg by mouth daily.      Cholecalciferol 125 MCG (5000 UT) Oral Tab Take 1 tablet (5,000 Units total) by mouth daily.      Multiple Vitamins-Minerals (PRESERVISION AREDS 2) Oral Cap Take by mouth.      Polyethylene Glycol 3350 (MIRALAX OR) Take by mouth.       buPROPion  MG Oral Tablet 24 Hr Take 1 tablet (150 mg total) by mouth daily. (Patient not taking: Reported on 7/3/2025)     [2]   Allergies  Allergen Reactions    Sulfa Antibiotics RASH     As a child    Seasonal OTHER (SEE COMMENTS)     Fever, Cough, upper congestion    [3]   Past Medical History:   Allergic rhinitis    Urgent care doc thought it was dogwood maybe    Anxiety    Arrhythmia    in past during go off effexor but determined per holter negative    Arthritis    Back pain    Cancer (HCC)    skin    Depression    Esophageal reflux    Essential hypertension    Gallbladder attack    not functioning, RUQ pain,     Gastroparesis    Headache disorder    pms headaches; better    Mass of stomach    Menorrhagia with regular cycle    Mild episode of recurrent major depressive disorder    Obesity    Post partum depression    four children; each time    Sleep apnea    cpap in past when gained wt   [4]   Past Surgical History:  Procedure Laterality Date    Back surgery      Multiple    Cholecystectomy      Colonoscopy      D & c      Hemorrhoidectomy  ?          Other surgical history      back surgery herniated disc lumbar    Other surgical history      eyelid left    Other surgical history      microdicectomy    Other surgical history      skin cancer removed    Other surgical history      laps and GI    Other surgical history      Skin surgery      Tonsillectomy      Transcervical ablation uterine fibroid rf     [5]   Family History  Problem Relation Age of Onset    Depression Mother         oldest; was inpt at [facility] 2017; [place], boris; eating disorder    Anxiety Mother     Pacemaker Mother     Other (stomach issues) Mother     Cancer Father     Heart Disorder Father     Other (A fib) Father     Other (leukemia) Father     Other (melanoma) Father     Stroke Father     Hypertension Brother     Dementia Maternal Grandmother     Colon Cancer Maternal Grandfather     No Known Problems Paternal  Grandmother     No Known Problems Paternal Grandfather     OCD Daughter     Depression Daughter     Bipolar Disorder Daughter     High Blood Pressure Daughter     High Blood Pressure Son     High Cholesterol Son    [6]   Social History  Socioeconomic History    Marital status:    Tobacco Use    Smoking status: Never    Smokeless tobacco: Never   Vaping Use    Vaping status: Never Used   Substance and Sexual Activity    Alcohol use: Yes     Comment: Social drinker very occasionally    Drug use: Yes     Frequency: 3.0 times per week     Types: Cannabis     Comment: Patient will take an edible occasionally before bed.    Sexual activity: Yes     Partners: Male     Birth control/protection: Vasectomy   Other Topics Concern    Caffeine Concern No    Exercise No    Seat Belt No    Special Diet No    Stress Concern No    Weight Concern No     Social Drivers of Health     Food Insecurity: No Food Insecurity (7/3/2025)    NCSS - Food Insecurity     Worried About Running Out of Food in the Last Year: No     Ran Out of Food in the Last Year: No   Transportation Needs: No Transportation Needs (7/3/2025)    NCSS - Transportation     Lack of Transportation: No   Housing Stability: Not At Risk (7/3/2025)    NCSS - Housing/Utilities     Has Housing: Yes     Worried About Losing Housing: No     Unable to Get Utilities: No

## 2025-07-04 NOTE — TELEPHONE ENCOUNTER
Pt was seen for PE. Hydrochlorothiazide increased and needs a BMP in a month. Please mail her the order since she gets it done at a lab by her.

## 2025-07-07 NOTE — TELEPHONE ENCOUNTER
Incoming (mail or fax): fax  Received from:  [facility]  Documentation given to:  Justine    Medical records

## 2025-07-07 NOTE — TELEPHONE ENCOUNTER
Left detailed message advising of below and to call for any questions or concerns, [id number] message also sent. Lab order mailed.

## 2025-07-08 ENCOUNTER — MED REC SCAN ONLY (OUTPATIENT)
Dept: INTERNAL MEDICINE CLINIC | Facility: CLINIC | Age: 50
End: 2025-07-08

## 2025-08-19 DIAGNOSIS — I10 PRIMARY HYPERTENSION: ICD-10-CM

## 2025-08-20 RX ORDER — HYDROCHLOROTHIAZIDE 12.5 MG/1
12.5 TABLET ORAL DAILY
Qty: 90 TABLET | Refills: 0 | OUTPATIENT
Start: 2025-08-20

## 2025-08-22 ENCOUNTER — OFFICE VISIT (OUTPATIENT)
Dept: INTERNAL MEDICINE CLINIC | Facility: CLINIC | Age: 50
End: 2025-08-22

## 2025-08-22 VITALS
RESPIRATION RATE: 16 BRPM | SYSTOLIC BLOOD PRESSURE: 130 MMHG | WEIGHT: 249.19 LBS | BODY MASS INDEX: 35.67 KG/M2 | TEMPERATURE: 97 F | OXYGEN SATURATION: 96 % | DIASTOLIC BLOOD PRESSURE: 80 MMHG | HEIGHT: 70.12 IN | HEART RATE: 54 BPM

## 2025-08-22 DIAGNOSIS — R00.2 PALPITATIONS: ICD-10-CM

## 2025-08-22 DIAGNOSIS — S46.812A STRAIN OF LEFT TRAPEZIUS MUSCLE, INITIAL ENCOUNTER: Primary | ICD-10-CM

## 2025-08-22 LAB
AMB EXT BUN: 16 MG/DL
AMB EXT CALCIUM: 9.2
AMB EXT CARBON DIOXIDE: 31
AMB EXT CHLORIDE: 104
AMB EXT CREATININE: 0.92 MG/DL
AMB EXT EGFR NON-AA: 68
AMB EXT GLUCOSE: 90 MG/DL
AMB EXT POSTASSIUM: 3.9 MMOL/L
AMB EXT SODIUM: 145 MMOL/L

## 2025-08-22 PROCEDURE — 3008F BODY MASS INDEX DOCD: CPT | Performed by: NURSE PRACTITIONER

## 2025-08-22 PROCEDURE — 3075F SYST BP GE 130 - 139MM HG: CPT | Performed by: NURSE PRACTITIONER

## 2025-08-22 PROCEDURE — 99214 OFFICE O/P EST MOD 30 MIN: CPT | Performed by: NURSE PRACTITIONER

## 2025-08-22 PROCEDURE — G2211 COMPLEX E/M VISIT ADD ON: HCPCS | Performed by: NURSE PRACTITIONER

## 2025-08-22 PROCEDURE — 3079F DIAST BP 80-89 MM HG: CPT | Performed by: NURSE PRACTITIONER

## 2025-08-22 RX ORDER — MELOXICAM 15 MG/1
15 TABLET ORAL
Qty: 30 TABLET | Refills: 0 | Status: SHIPPED | OUTPATIENT
Start: 2025-08-22

## 2025-08-22 RX ORDER — CYCLOBENZAPRINE HCL 5 MG
TABLET ORAL 3 TIMES DAILY PRN
Qty: 30 TABLET | Refills: 0 | Status: SHIPPED | OUTPATIENT
Start: 2025-08-22

## 2025-08-23 ENCOUNTER — TELEPHONE (OUTPATIENT)
Dept: INTERNAL MEDICINE CLINIC | Facility: CLINIC | Age: 50
End: 2025-08-23

## 2025-08-25 DIAGNOSIS — Z79.899 MEDICATION MANAGEMENT: Primary | ICD-10-CM

## 2025-08-29 ENCOUNTER — MED REC SCAN ONLY (OUTPATIENT)
Dept: INTERNAL MEDICINE CLINIC | Facility: CLINIC | Age: 50
End: 2025-08-29

## (undated) NOTE — LETTER
Elan Reyes,        Here is the link to the heart scan with shonda, this is a CT scan that will look at the heart and lungs and will give a score on how much calcification is built up, if any:    [email or web address]    Here is the link to the peripheral vascular screenings, there are three tests:    An ultrasound of the carotid arteries in your neck to check for narrowing caused by plaque  An ultrasound of the abdominal aorta, the largest blood vessel in your body, to screen for an aneurysm  A check of the blood flow in your legs, to screen for peripheral arterial disease (PAD)     You can choose which ones or all of them if you'd like.    [email or web address]    You can call to schedule these at [phone]. If you call please ask to schedule the screening ultrasounds, no order is needed for screening testing!      Thank you kindly,  [facility]